# Patient Record
Sex: FEMALE | Race: WHITE | NOT HISPANIC OR LATINO | URBAN - METROPOLITAN AREA
[De-identification: names, ages, dates, MRNs, and addresses within clinical notes are randomized per-mention and may not be internally consistent; named-entity substitution may affect disease eponyms.]

---

## 2017-03-13 ENCOUNTER — INPATIENT (INPATIENT)
Facility: HOSPITAL | Age: 32
LOS: 2 days | Discharge: HOME | End: 2017-03-16
Attending: OBSTETRICS & GYNECOLOGY | Admitting: OBSTETRICS & GYNECOLOGY
Payer: COMMERCIAL

## 2017-03-14 VITALS
OXYGEN SATURATION: 100 % | DIASTOLIC BLOOD PRESSURE: 69 MMHG | SYSTOLIC BLOOD PRESSURE: 121 MMHG | HEART RATE: 85 BPM | RESPIRATION RATE: 17 BRPM

## 2017-03-14 LAB
BASOPHILS NFR BLD AUTO: 0.3 % — SIGNIFICANT CHANGE UP (ref 0–2)
BLD GP AB SCN SERPL QL: POSITIVE — SIGNIFICANT CHANGE UP
EOSINOPHIL NFR BLD AUTO: 0.9 % — SIGNIFICANT CHANGE UP (ref 0–6)
HCT VFR BLD CALC: 37.8 % — SIGNIFICANT CHANGE UP (ref 34.5–45)
HGB BLD-MCNC: 13.3 G/DL — SIGNIFICANT CHANGE UP (ref 11.5–15.5)
LYMPHOCYTES # BLD AUTO: 23.5 % — SIGNIFICANT CHANGE UP (ref 13–44)
MCHC RBC-ENTMCNC: 32 PG — SIGNIFICANT CHANGE UP (ref 27–34)
MCHC RBC-ENTMCNC: 35.2 G/DL — SIGNIFICANT CHANGE UP (ref 32–36)
MCV RBC AUTO: 90.9 FL — SIGNIFICANT CHANGE UP (ref 80–100)
MONOCYTES NFR BLD AUTO: 8.8 % — SIGNIFICANT CHANGE UP (ref 2–14)
NEUTROPHILS NFR BLD AUTO: 66.5 % — SIGNIFICANT CHANGE UP (ref 43–77)
PLATELET # BLD AUTO: 177 K/UL — SIGNIFICANT CHANGE UP (ref 150–400)
RBC # BLD: 4.16 M/UL — SIGNIFICANT CHANGE UP (ref 3.8–5.2)
RBC # FLD: 12.4 % — SIGNIFICANT CHANGE UP (ref 10.3–16.9)
RH IG SCN BLD-IMP: NEGATIVE — SIGNIFICANT CHANGE UP
T PALLIDUM AB TITR SER: NEGATIVE — SIGNIFICANT CHANGE UP
WBC # BLD: 11.6 K/UL — HIGH (ref 3.8–10.5)
WBC # FLD AUTO: 11.6 K/UL — HIGH (ref 3.8–10.5)

## 2017-03-14 PROCEDURE — 86077 PHYS BLOOD BANK SERV XMATCH: CPT

## 2017-03-14 RX ORDER — DIPHENHYDRAMINE HCL 50 MG
25 CAPSULE ORAL EVERY 6 HOURS
Qty: 0 | Refills: 0 | Status: DISCONTINUED | OUTPATIENT
Start: 2017-03-14 | End: 2017-03-16

## 2017-03-14 RX ORDER — OXYTOCIN 10 UNIT/ML
333.33 VIAL (ML) INJECTION
Qty: 20 | Refills: 0 | Status: DISCONTINUED | OUTPATIENT
Start: 2017-03-14 | End: 2017-03-14

## 2017-03-14 RX ORDER — SODIUM CHLORIDE 9 MG/ML
1000 INJECTION, SOLUTION INTRAVENOUS ONCE
Qty: 0 | Refills: 0 | Status: DISCONTINUED | OUTPATIENT
Start: 2017-03-14 | End: 2017-03-14

## 2017-03-14 RX ORDER — SODIUM CHLORIDE 9 MG/ML
1000 INJECTION, SOLUTION INTRAVENOUS
Qty: 0 | Refills: 0 | Status: DISCONTINUED | OUTPATIENT
Start: 2017-03-14 | End: 2017-03-14

## 2017-03-14 RX ORDER — DIBUCAINE 1 %
1 OINTMENT (GRAM) RECTAL EVERY 4 HOURS
Qty: 0 | Refills: 0 | Status: DISCONTINUED | OUTPATIENT
Start: 2017-03-14 | End: 2017-03-16

## 2017-03-14 RX ORDER — PRAMOXINE HYDROCHLORIDE 150 MG/15G
1 AEROSOL, FOAM RECTAL EVERY 4 HOURS
Qty: 0 | Refills: 0 | Status: DISCONTINUED | OUTPATIENT
Start: 2017-03-14 | End: 2017-03-16

## 2017-03-14 RX ORDER — SIMETHICONE 80 MG/1
80 TABLET, CHEWABLE ORAL EVERY 6 HOURS
Qty: 0 | Refills: 0 | Status: DISCONTINUED | OUTPATIENT
Start: 2017-03-14 | End: 2017-03-16

## 2017-03-14 RX ORDER — TETANUS TOXOID, REDUCED DIPHTHERIA TOXOID AND ACELLULAR PERTUSSIS VACCINE, ADSORBED 5; 2.5; 8; 8; 2.5 [IU]/.5ML; [IU]/.5ML; UG/.5ML; UG/.5ML; UG/.5ML
0.5 SUSPENSION INTRAMUSCULAR ONCE
Qty: 0 | Refills: 0 | Status: DISCONTINUED | OUTPATIENT
Start: 2017-03-14 | End: 2017-03-16

## 2017-03-14 RX ORDER — INFLUENZA VIRUS VACCINE 15; 15; 15; 15 UG/.5ML; UG/.5ML; UG/.5ML; UG/.5ML
0.5 SUSPENSION INTRAMUSCULAR ONCE
Qty: 0 | Refills: 0 | Status: COMPLETED | OUTPATIENT
Start: 2017-03-14 | End: 2017-03-14

## 2017-03-14 RX ORDER — IBUPROFEN 200 MG
1 TABLET ORAL
Qty: 0 | Refills: 0 | COMMUNITY
Start: 2017-03-14

## 2017-03-14 RX ORDER — CITRIC ACID/SODIUM CITRATE 300-500 MG
15 SOLUTION, ORAL ORAL EVERY 4 HOURS
Qty: 0 | Refills: 0 | Status: DISCONTINUED | OUTPATIENT
Start: 2017-03-14 | End: 2017-03-14

## 2017-03-14 RX ORDER — ACETAMINOPHEN 500 MG
650 TABLET ORAL EVERY 6 HOURS
Qty: 0 | Refills: 0 | Status: DISCONTINUED | OUTPATIENT
Start: 2017-03-14 | End: 2017-03-16

## 2017-03-14 RX ORDER — MAGNESIUM HYDROXIDE 400 MG/1
30 TABLET, CHEWABLE ORAL
Qty: 0 | Refills: 0 | Status: DISCONTINUED | OUTPATIENT
Start: 2017-03-14 | End: 2017-03-16

## 2017-03-14 RX ORDER — LANOLIN
1 OINTMENT (GRAM) TOPICAL EVERY 6 HOURS
Qty: 0 | Refills: 0 | Status: DISCONTINUED | OUTPATIENT
Start: 2017-03-14 | End: 2017-03-16

## 2017-03-14 RX ORDER — IBUPROFEN 200 MG
600 TABLET ORAL EVERY 6 HOURS
Qty: 0 | Refills: 0 | Status: DISCONTINUED | OUTPATIENT
Start: 2017-03-14 | End: 2017-03-16

## 2017-03-14 RX ORDER — AER TRAVELER 0.5 G/1
1 SOLUTION RECTAL; TOPICAL EVERY 4 HOURS
Qty: 0 | Refills: 0 | Status: DISCONTINUED | OUTPATIENT
Start: 2017-03-14 | End: 2017-03-16

## 2017-03-14 RX ORDER — DOCUSATE SODIUM 100 MG
100 CAPSULE ORAL
Qty: 0 | Refills: 0 | Status: DISCONTINUED | OUTPATIENT
Start: 2017-03-14 | End: 2017-03-16

## 2017-03-14 RX ORDER — HYDROCORTISONE 1 %
1 OINTMENT (GRAM) TOPICAL EVERY 4 HOURS
Qty: 0 | Refills: 0 | Status: DISCONTINUED | OUTPATIENT
Start: 2017-03-14 | End: 2017-03-16

## 2017-03-14 RX ORDER — LEVOTHYROXINE SODIUM 125 MCG
1 TABLET ORAL
Qty: 0 | Refills: 0 | COMMUNITY

## 2017-03-14 RX ORDER — GLYCERIN ADULT
1 SUPPOSITORY, RECTAL RECTAL AT BEDTIME
Qty: 0 | Refills: 0 | Status: DISCONTINUED | OUTPATIENT
Start: 2017-03-14 | End: 2017-03-16

## 2017-03-14 RX ORDER — SODIUM CHLORIDE 9 MG/ML
3 INJECTION INTRAMUSCULAR; INTRAVENOUS; SUBCUTANEOUS EVERY 8 HOURS
Qty: 0 | Refills: 0 | Status: DISCONTINUED | OUTPATIENT
Start: 2017-03-14 | End: 2017-03-16

## 2017-03-14 RX ORDER — OXYTOCIN 10 UNIT/ML
41.33 VIAL (ML) INJECTION
Qty: 20 | Refills: 0 | Status: DISCONTINUED | OUTPATIENT
Start: 2017-03-14 | End: 2017-03-16

## 2017-03-14 RX ADMIN — SODIUM CHLORIDE 3 MILLILITER(S): 9 INJECTION INTRAMUSCULAR; INTRAVENOUS; SUBCUTANEOUS at 13:42

## 2017-03-14 RX ADMIN — Medication 600 MILLIGRAM(S): at 21:24

## 2017-03-14 RX ADMIN — Medication 650 MILLIGRAM(S): at 05:40

## 2017-03-14 RX ADMIN — SODIUM CHLORIDE 3 MILLILITER(S): 9 INJECTION INTRAMUSCULAR; INTRAVENOUS; SUBCUTANEOUS at 21:27

## 2017-03-14 RX ADMIN — SODIUM CHLORIDE 3 MILLILITER(S): 9 INJECTION INTRAMUSCULAR; INTRAVENOUS; SUBCUTANEOUS at 07:45

## 2017-03-14 RX ADMIN — Medication 600 MILLIGRAM(S): at 13:42

## 2017-03-14 RX ADMIN — Medication 1 TABLET(S): at 13:03

## 2017-03-14 RX ADMIN — Medication 100 MILLIGRAM(S): at 13:03

## 2017-03-14 RX ADMIN — Medication 600 MILLIGRAM(S): at 03:33

## 2017-03-14 RX ADMIN — Medication 124 MILLIUNIT(S)/MIN: at 02:10

## 2017-03-14 RX ADMIN — Medication 1 APPLICATION(S): at 21:25

## 2017-03-14 RX ADMIN — Medication 600 MILLIGRAM(S): at 22:17

## 2017-03-14 RX ADMIN — Medication 600 MILLIGRAM(S): at 13:03

## 2017-03-14 RX ADMIN — Medication 650 MILLIGRAM(S): at 06:40

## 2017-03-14 NOTE — DISCHARGE NOTE OB - CARE PLAN
Principal Discharge DX:	Vaginal delivery  Goal:	normal recovery  Instructions for follow-up, activity and diet:	Follow up in the office in 6 weeks. Nothing in the vagina for 6 weeks.

## 2017-03-14 NOTE — DISCHARGE NOTE OB - CARE PROVIDER_API CALL
Rena Haynes), Obstetrics and Gynecology  [address] [place] [address]  Phone: [phone]  Fax: [phone]

## 2017-03-14 NOTE — DISCHARGE NOTE OB - PATIENT PORTAL LINK FT
“You can access the FollowHealth Patient Portal, offered by [facility], by registering with the following website: [email or web address]”

## 2017-03-14 NOTE — DISCHARGE NOTE OB - MEDICATION SUMMARY - MEDICATIONS TO TAKE
I will START or STAY ON the medications listed below when I get home from the hospital:    ibuprofen 600 mg oral tablet  -- 1 tab(s) by mouth every 6 hours, As needed, Moderate Pain  -- Indication: For PREGNANCY    Prenatal 1 oral capsule  -- 1 cap(s) by mouth once a day  -- Indication: For PREGNANCY I will START or STAY ON the medications listed below when I get home from the hospital:    ibuprofen 600 mg oral tablet  -- 1 tab(s) by mouth every 6 hours, As needed, Moderate Pain  -- Indication: For moderate pain    Prenatal 1 oral capsule  -- 1 cap(s) by mouth once a day  -- Indication: For PREGNANCY

## 2017-03-15 RX ADMIN — Medication 600 MILLIGRAM(S): at 14:15

## 2017-03-15 RX ADMIN — Medication 600 MILLIGRAM(S): at 13:17

## 2017-03-15 RX ADMIN — Medication 600 MILLIGRAM(S): at 19:33

## 2017-03-15 RX ADMIN — Medication 1 TABLET(S): at 13:17

## 2017-03-15 RX ADMIN — Medication 600 MILLIGRAM(S): at 06:00

## 2017-03-15 RX ADMIN — Medication 100 MILLIGRAM(S): at 13:17

## 2017-03-15 RX ADMIN — Medication 600 MILLIGRAM(S): at 06:41

## 2017-03-15 RX ADMIN — SODIUM CHLORIDE 3 MILLILITER(S): 9 INJECTION INTRAMUSCULAR; INTRAVENOUS; SUBCUTANEOUS at 06:40

## 2017-03-15 RX ADMIN — Medication 600 MILLIGRAM(S): at 19:04

## 2017-03-15 NOTE — PROGRESS NOTE ADULT - SUBJECTIVE AND OBJECTIVE BOX
Patient evaluated at bedside.   She reports pain is well controlled with motrin.  She denies headache, dizziness, chest pain, palpitations, shortness of breathe, nausea, vomiting, heavy vaginal bleeding or perineal discomfort.  She has been ambulating without assistance, voiding spontaneously, and is breastfeeding.    Physical Exam:  Vital Signs Last 24 Hrs  T(C): 36.4, Max: 36.8 (03-14 @ 09:51)  T(F): 97.6, Max: 98.3 (03-14 @ 09:51)  HR: 71 (71 - 91)  BP: 122/72 (108/72 - 122/72)  BP(mean): --  RR: 18 (16 - 18)  SpO2: 98% (95% - 98%)    GA: NAD, A+0 x 3  CV: RRR  Pulm: CTAB  Breasts: soft, nontender, no palpable masses  Abd: + BS, soft, nontender, nondistended, no rebound or guarding, uterus firm at midline, 2 fb below umbilicus  : lochia WNL  Extremities: no swelling or calf tenderness, reflexes +2 bilaterally                          13.3   11.6  )-----------( 177      ( 14 Mar 2017 01:33 )             37.8

## 2017-03-15 NOTE — PROGRESS NOTE ADULT - SUBJECTIVE AND OBJECTIVE BOX
Patient evaluated at bedside.   She reports pain is well controlled with Motrin only  She denies headache, dizziness, chest pain, palpitations, shortness of breathe, nausea, vomiting, heavy vaginal bleeding or perineal discomfort.  She has been ambulating without assistance, voiding spontaneously, and is breastfeeding.  Tolerating regular diet    Physical Exam:  VSS:     GA: NAD, A+0 x 3  CV: RRR  Pulm: CTAB  Breasts: soft, nontender, no palpable masses  Abd: + BS, soft, nontender, nondistended, no rebound or guarding, uterus firm at midline, 4  fb below umbilicus  : lochia WNL; Perineum: healing well  Extremities: no swelling or calf tenderness, reflexes +2 bilaterally                       13.3   11.6  )-----------( 177      ( 14 Mar 2017 01:33 )             37.8     Assessment:   PPD #  1 S/P   Plan:  Supportive Care  Diet as tolerated  Oral pain Meds  d/c home in AM

## 2017-03-15 NOTE — PROGRESS NOTE ADULT - ASSESSMENT
A/P yo 31y s/p , PPD #1, stable  1. Pain: Motrin prn  2. GI: Reg diet  3. Encourage breastfeeding  4. DVT prophylaxis: early ambulation  5. Dispo: PP2

## 2017-03-16 VITALS
RESPIRATION RATE: 18 BRPM | OXYGEN SATURATION: 97 % | TEMPERATURE: 98 F | SYSTOLIC BLOOD PRESSURE: 104 MMHG | DIASTOLIC BLOOD PRESSURE: 69 MMHG | HEART RATE: 86 BPM

## 2017-03-16 LAB — SURGICAL PATHOLOGY STUDY: SIGNIFICANT CHANGE UP

## 2017-03-16 PROCEDURE — 86780 TREPONEMA PALLIDUM: CPT

## 2017-03-16 PROCEDURE — 36415 COLL VENOUS BLD VENIPUNCTURE: CPT

## 2017-03-16 PROCEDURE — 86900 BLOOD TYPING SEROLOGIC ABO: CPT

## 2017-03-16 PROCEDURE — 86870 RBC ANTIBODY IDENTIFICATION: CPT

## 2017-03-16 PROCEDURE — 86880 COOMBS TEST DIRECT: CPT

## 2017-03-16 PROCEDURE — 86901 BLOOD TYPING SEROLOGIC RH(D): CPT

## 2017-03-16 PROCEDURE — 99214 OFFICE O/P EST MOD 30 MIN: CPT

## 2017-03-16 PROCEDURE — 88307 TISSUE EXAM BY PATHOLOGIST: CPT

## 2017-03-16 PROCEDURE — 85025 COMPLETE CBC W/AUTO DIFF WBC: CPT

## 2017-03-16 PROCEDURE — 86850 RBC ANTIBODY SCREEN: CPT

## 2017-03-16 RX ADMIN — Medication 600 MILLIGRAM(S): at 06:30

## 2017-03-16 RX ADMIN — Medication 100 MILLIGRAM(S): at 06:28

## 2017-03-16 RX ADMIN — Medication 600 MILLIGRAM(S): at 07:21

## 2017-03-16 NOTE — PROGRESS NOTE ADULT - SUBJECTIVE AND OBJECTIVE BOX
Patient evaluated at bedside.   She reports pain is well controlled with Motrin  She denies headache, dizziness, chest pain, palpitations, shortness of breathe, nausea, vomiting, heavy vaginal bleeding or perineal discomfort.  She has been ambulating without assistance, voiding spontaneously, and is breastfeeding.  Tolerating regular diet    Physical Exam:  VSS:     GA: NAD, A+0 x 3  CV: RRR  Pulm: CTAB  Breasts: soft, nontender, no palpable masses  Abd: + BS, soft, nontender, nondistended, no rebound or guarding, uterus firm at midline,   fb below umbilicus  : lochia WNL; Perineum: healing well  Extremities: no swelling or calf tenderness, reflexes +2 bilaterally    Assessment:   PPD #  2 S/P   Plan:  Supportive Care  Diet as tolerated  Oral pain Meds  Discharge home

## 2017-03-20 DIAGNOSIS — E03.9 HYPOTHYROIDISM, UNSPECIFIED: ICD-10-CM

## 2017-03-20 DIAGNOSIS — Z34.83 ENCOUNTER FOR SUPERVISION OF OTHER NORMAL PREGNANCY, THIRD TRIMESTER: ICD-10-CM

## 2017-03-20 DIAGNOSIS — Z3A.39 39 WEEKS GESTATION OF PREGNANCY: ICD-10-CM

## 2018-09-14 ENCOUNTER — TRANSCRIPTION ENCOUNTER (OUTPATIENT)
Age: 33
End: 2018-09-14

## 2018-09-15 ENCOUNTER — EMERGENCY (EMERGENCY)
Facility: HOSPITAL | Age: 33
LOS: 1 days | Discharge: HOME | End: 2018-09-15
Attending: EMERGENCY MEDICINE
Payer: COMMERCIAL

## 2018-09-15 VITALS
OXYGEN SATURATION: 98 % | RESPIRATION RATE: 18 BRPM | DIASTOLIC BLOOD PRESSURE: 74 MMHG | HEART RATE: 113 BPM | HEIGHT: 66 IN | TEMPERATURE: 99 F | WEIGHT: 138.01 LBS | SYSTOLIC BLOOD PRESSURE: 111 MMHG

## 2018-09-15 VITALS
HEART RATE: 70 BPM | TEMPERATURE: 98 F | DIASTOLIC BLOOD PRESSURE: 63 MMHG | OXYGEN SATURATION: 99 % | SYSTOLIC BLOOD PRESSURE: 93 MMHG | RESPIRATION RATE: 18 BRPM

## 2018-09-15 LAB
ALBUMIN SERPL ELPH-MCNC: 4 G/DL — SIGNIFICANT CHANGE UP (ref 3.3–5)
ALP SERPL-CCNC: 40 U/L — SIGNIFICANT CHANGE UP (ref 40–120)
ALT FLD-CCNC: 23 U/L — SIGNIFICANT CHANGE UP (ref 10–45)
ANION GAP SERPL CALC-SCNC: 9 MMOL/L — SIGNIFICANT CHANGE UP (ref 5–17)
APPEARANCE UR: CLEAR — SIGNIFICANT CHANGE UP
APTT BLD: 27.3 SEC — LOW (ref 27.5–37.4)
AST SERPL-CCNC: 28 U/L — SIGNIFICANT CHANGE UP (ref 10–40)
BACTERIA # UR AUTO: ABNORMAL
BASOPHILS # BLD AUTO: 0 K/UL — SIGNIFICANT CHANGE UP (ref 0–0.2)
BASOPHILS NFR BLD AUTO: 0 % — SIGNIFICANT CHANGE UP (ref 0–2)
BILIRUB SERPL-MCNC: 0.3 MG/DL — SIGNIFICANT CHANGE UP (ref 0.2–1.2)
BILIRUB UR-MCNC: NEGATIVE — SIGNIFICANT CHANGE UP
BUN SERPL-MCNC: 12 MG/DL — SIGNIFICANT CHANGE UP (ref 7–23)
CALCIUM SERPL-MCNC: 8.9 MG/DL — SIGNIFICANT CHANGE UP (ref 8.4–10.5)
CHLORIDE SERPL-SCNC: 100 MMOL/L — SIGNIFICANT CHANGE UP (ref 96–108)
CO2 SERPL-SCNC: 25 MMOL/L — SIGNIFICANT CHANGE UP (ref 22–31)
COLOR SPEC: YELLOW — SIGNIFICANT CHANGE UP
CREAT SERPL-MCNC: 0.87 MG/DL — SIGNIFICANT CHANGE UP (ref 0.5–1.3)
DIFF PNL FLD: ABNORMAL
EOSINOPHIL # BLD AUTO: 0 K/UL — SIGNIFICANT CHANGE UP (ref 0–0.5)
EOSINOPHIL NFR BLD AUTO: 0.8 % — SIGNIFICANT CHANGE UP (ref 0–6)
EPI CELLS # UR: 7 /HPF — HIGH
GAS PNL BLDV: SIGNIFICANT CHANGE UP
GLUCOSE SERPL-MCNC: 113 MG/DL — HIGH (ref 70–99)
GLUCOSE UR QL: NEGATIVE — SIGNIFICANT CHANGE UP
HCG UR QL: NEGATIVE — SIGNIFICANT CHANGE UP
HCT VFR BLD CALC: 39.5 % — SIGNIFICANT CHANGE UP (ref 34.5–45)
HGB BLD-MCNC: 13.3 G/DL — SIGNIFICANT CHANGE UP (ref 11.5–15.5)
HYALINE CASTS # UR AUTO: 1 /LPF — SIGNIFICANT CHANGE UP (ref 0–2)
INR BLD: 1.18 RATIO — HIGH (ref 0.88–1.16)
KETONES UR-MCNC: ABNORMAL
LEUKOCYTE ESTERASE UR-ACNC: ABNORMAL
LYMPHOCYTES # BLD AUTO: 0.8 K/UL — LOW (ref 1–3.3)
LYMPHOCYTES # BLD AUTO: 28.2 % — SIGNIFICANT CHANGE UP (ref 13–44)
MCHC RBC-ENTMCNC: 28.9 PG — SIGNIFICANT CHANGE UP (ref 27–34)
MCHC RBC-ENTMCNC: 33.6 GM/DL — SIGNIFICANT CHANGE UP (ref 32–36)
MCV RBC AUTO: 85.8 FL — SIGNIFICANT CHANGE UP (ref 80–100)
MONOCYTES # BLD AUTO: 0.7 K/UL — SIGNIFICANT CHANGE UP (ref 0–0.9)
MONOCYTES NFR BLD AUTO: 25.2 % — HIGH (ref 2–14)
NEUTROPHILS # BLD AUTO: 1.3 K/UL — LOW (ref 1.8–7.4)
NEUTROPHILS NFR BLD AUTO: 45.8 % — SIGNIFICANT CHANGE UP (ref 43–77)
NITRITE UR-MCNC: NEGATIVE — SIGNIFICANT CHANGE UP
PH UR: 6.5 — SIGNIFICANT CHANGE UP (ref 5–8)
PLATELET # BLD AUTO: 101 K/UL — LOW (ref 150–400)
POTASSIUM SERPL-MCNC: 4.2 MMOL/L — SIGNIFICANT CHANGE UP (ref 3.5–5.3)
POTASSIUM SERPL-SCNC: 4.2 MMOL/L — SIGNIFICANT CHANGE UP (ref 3.5–5.3)
PROT SERPL-MCNC: 7.3 G/DL — SIGNIFICANT CHANGE UP (ref 6–8.3)
PROT UR-MCNC: ABNORMAL
PROTHROM AB SERPL-ACNC: 12.8 SEC — HIGH (ref 9.8–12.7)
RAPID RVP RESULT: SIGNIFICANT CHANGE UP
RBC # BLD: 4.6 M/UL — SIGNIFICANT CHANGE UP (ref 3.8–5.2)
RBC # FLD: 12.5 % — SIGNIFICANT CHANGE UP (ref 10.3–14.5)
RBC CASTS # UR COMP ASSIST: 2 /HPF — SIGNIFICANT CHANGE UP (ref 0–4)
SODIUM SERPL-SCNC: 134 MMOL/L — LOW (ref 135–145)
SP GR SPEC: 1.03 — HIGH (ref 1.01–1.02)
UROBILINOGEN FLD QL: ABNORMAL
WBC # BLD: 2.7 K/UL — LOW (ref 3.8–10.5)
WBC # FLD AUTO: 2.7 K/UL — LOW (ref 3.8–10.5)
WBC UR QL: 7 /HPF — HIGH (ref 0–5)

## 2018-09-15 PROCEDURE — 80053 COMPREHEN METABOLIC PANEL: CPT

## 2018-09-15 PROCEDURE — 84295 ASSAY OF SERUM SODIUM: CPT

## 2018-09-15 PROCEDURE — 84132 ASSAY OF SERUM POTASSIUM: CPT

## 2018-09-15 PROCEDURE — 99284 EMERGENCY DEPT VISIT MOD MDM: CPT

## 2018-09-15 PROCEDURE — 99283 EMERGENCY DEPT VISIT LOW MDM: CPT | Mod: 25

## 2018-09-15 PROCEDURE — 87633 RESP VIRUS 12-25 TARGETS: CPT

## 2018-09-15 PROCEDURE — 82435 ASSAY OF BLOOD CHLORIDE: CPT

## 2018-09-15 PROCEDURE — 85610 PROTHROMBIN TIME: CPT

## 2018-09-15 PROCEDURE — 71046 X-RAY EXAM CHEST 2 VIEWS: CPT | Mod: 26

## 2018-09-15 PROCEDURE — 82947 ASSAY GLUCOSE BLOOD QUANT: CPT

## 2018-09-15 PROCEDURE — 87798 DETECT AGENT NOS DNA AMP: CPT

## 2018-09-15 PROCEDURE — 82330 ASSAY OF CALCIUM: CPT

## 2018-09-15 PROCEDURE — 87581 M.PNEUMON DNA AMP PROBE: CPT

## 2018-09-15 PROCEDURE — 81001 URINALYSIS AUTO W/SCOPE: CPT

## 2018-09-15 PROCEDURE — 85027 COMPLETE CBC AUTOMATED: CPT

## 2018-09-15 PROCEDURE — 82803 BLOOD GASES ANY COMBINATION: CPT

## 2018-09-15 PROCEDURE — 83605 ASSAY OF LACTIC ACID: CPT

## 2018-09-15 PROCEDURE — 71046 X-RAY EXAM CHEST 2 VIEWS: CPT

## 2018-09-15 PROCEDURE — 87486 CHLMYD PNEUM DNA AMP PROBE: CPT

## 2018-09-15 PROCEDURE — 85730 THROMBOPLASTIN TIME PARTIAL: CPT

## 2018-09-15 PROCEDURE — 81025 URINE PREGNANCY TEST: CPT

## 2018-09-15 PROCEDURE — 85014 HEMATOCRIT: CPT

## 2018-09-15 RX ORDER — SODIUM CHLORIDE 9 MG/ML
1000 INJECTION INTRAMUSCULAR; INTRAVENOUS; SUBCUTANEOUS ONCE
Qty: 0 | Refills: 0 | Status: COMPLETED | OUTPATIENT
Start: 2018-09-15 | End: 2018-09-15

## 2018-09-15 RX ORDER — IBUPROFEN 200 MG
400 TABLET ORAL ONCE
Qty: 0 | Refills: 0 | Status: COMPLETED | OUTPATIENT
Start: 2018-09-15 | End: 2018-09-15

## 2018-09-15 RX ORDER — ACETAMINOPHEN 500 MG
650 TABLET ORAL ONCE
Qty: 0 | Refills: 0 | Status: COMPLETED | OUTPATIENT
Start: 2018-09-15 | End: 2018-09-15

## 2018-09-15 RX ADMIN — Medication 650 MILLIGRAM(S): at 13:10

## 2018-09-15 RX ADMIN — Medication 400 MILLIGRAM(S): at 14:01

## 2018-09-15 RX ADMIN — Medication 650 MILLIGRAM(S): at 10:51

## 2018-09-15 RX ADMIN — SODIUM CHLORIDE 2000 MILLILITER(S): 9 INJECTION INTRAMUSCULAR; INTRAVENOUS; SUBCUTANEOUS at 10:52

## 2018-09-15 NOTE — ED PROVIDER NOTE - PLAN OF CARE
1. Take Tylenol 650 mg every 6-8 hours or Motrin 600 mg every 8 hours as need for fevers and aches  2. Stay hydrated  3. Follow up with your primary care doctor within 3 days  4. Return to the emergency department for any vomiting, abdominal pain or any other concerns.

## 2018-09-15 NOTE — ED ADULT NURSE NOTE - OBJECTIVE STATEMENT
33 y.o. Female presents to the ED c/o fever. Hx hypothyroidism. A&Ox3. Ambulatory. Pt reports coming back from [place] last sat and started to feel a headache, fever/chills and neck stiffness. Pt reports feeling headache behind her eyes at first radiating to top part of her head to occipital part of head. Neuro intact. +ROM in all four extremities. Pt has been taking advil and states it breaks her fever, but symptoms persisted. Denies CP, SOB, N/V/D, urinary complications. Pt is in no current distress. Comfort and safety provided. Will continue to monitor. Awaiting ED MD consult.

## 2018-09-15 NOTE — ED PROVIDER NOTE - OBJECTIVE STATEMENT
32 y/o F hx of hypothyroidism presents with headache & fever.    States fever has been ongoing since last Monday 6 days PTA. Returned from [facility] last Saturday. Associated with sinus pressure. Saw PMD on Wednesday and told likely had viral infection. Has been feeling intermittent chills; Tmax 101 at home. Noted stiffness in neck yesterday and went to urgent care and given muscle relaxant. No dysuria, cough, productive sputum or diarrhea. No sick contacts. Relief with ibuprofen. Did not receive any antibiotics.       PMD: Dr. Dinh 32 y/o F hx of hypothyroidism presents with headache & fever.    States fever has been ongoing since last Monday 6 days PTA. Returned from [facility] last Saturday. Associated with sinus pressure. Saw PMD on Wednesday and told likely had viral infection. Has been feeling intermittent chills; Tmax 101 at home. Noted stiffness in neck yesterday and went to urgent care and given muscle relaxant. No dysuria, cough, productive sputum or diarrhea. No sick contacts. Relief with ibuprofen. Did not receive any antibiotics. No myalgias or photophobia.       PMD: Dr. Dinh

## 2018-09-15 NOTE — ED ADULT NURSE NOTE - DISCHARGE TEACHING
Follow up with PMD, take Tylenol/motrin as needed q6-8hrs as needed for chills/fever, stay hydrated, rest, return for new or worsening s/s

## 2018-09-15 NOTE — ED PROVIDER NOTE - PROGRESS NOTE DETAILS
Lab work unrevealing for bacterial source of fevers. Patient reports symptomatic improvement s/p NSAIDs & IVF. Will discharge with PCP follow up.

## 2018-09-15 NOTE — ED PROVIDER NOTE - PHYSICAL EXAMINATION
GENERAL: non-toxic appearing  HEAD:  Atraumatic, Normocephalic  EYES: EOMI, PERRLA, conjunctiva and sclera clear  ENT: MMM; oropharynx clear  NECK: Supple, No JVD +Brudzinski  CHEST/LUNG: Clear to auscultation bilaterally; No wheeze  HEART: Regular rate and rhythm; No murmurs, rubs, or gallops  ABDOMEN: Soft, Nontender, Nondistended; Bowel sounds present  EXTREMITIES:  2+ Peripheral Pulses, No clubbing, cyanosis, or edema  PSYCH: AAOx3  NEUROLOGY: no focal motor or sensory deficits. 5/5 muscle strength in all extremities.   SKIN: No rashes or lesions GENERAL: non-toxic appearing  HEAD:  Atraumatic, Normocephalic  EYES: EOMI, PERRLA, conjunctiva and sclera clear  ENT: MMM; oropharynx clear  NECK: Supple, No JVD  CHEST/LUNG: Clear to auscultation bilaterally; No wheeze  HEART: Regular rate and rhythm; No murmurs, rubs, or gallops  ABDOMEN: Soft, Nontender, Nondistended; Bowel sounds present  EXTREMITIES:  2+ Peripheral Pulses, No clubbing, cyanosis, or edema  PSYCH: AAOx3  NEUROLOGY: no focal motor or sensory deficits. 5/5 muscle strength in all extremities.   SKIN: No rashes or lesions GENERAL: non-toxic appearing  HEAD:  Atraumatic, Normocephalic  EYES: EOMI, PERRLA, conjunctiva and sclera clear  ENT: MMM; oropharynx clear  NECK: Supple, No JVD; mild restriction in ROM 2/2 to pain without overt nuchal rigidity.   CHEST/LUNG: Clear to auscultation bilaterally; No wheeze  HEART: Regular rate and rhythm; No murmurs, rubs, or gallops  ABDOMEN: Soft, Nontender, Nondistended; Bowel sounds present  EXTREMITIES:  2+ Peripheral Pulses, No clubbing, cyanosis, or edema  PSYCH: AAOx3  NEUROLOGY: no focal motor or sensory deficits. 5/5 muscle strength in all extremities.   SKIN: No rashes or lesions

## 2018-09-15 NOTE — ED PROVIDER NOTE - CARE PLAN
Principal Discharge DX:	Viral illness  Assessment and plan of treatment:	1. Take Tylenol 650 mg every 6-8 hours or Motrin 600 mg every 8 hours as need for fevers and aches  2. Stay hydrated  3. Follow up with your primary care doctor within 3 days  4. Return to the emergency department for any vomiting, abdominal pain or any other concerns.

## 2018-09-15 NOTE — ED PROVIDER NOTE - MEDICAL DECISION MAKING DETAILS
34 y/o F hx of hypothyroidism presents with headache, fever & neck pain. Likely viral meningitis vs. viral illness. Non-toxic appearing not meeting sepsis criteria. Plan: IVF, basic labs, CXR, UA, reassess.

## 2018-09-15 NOTE — ED PROVIDER NOTE - ATTENDING CONTRIBUTION TO CARE
34 yo female with 5-6d of fever, chills, myalgias, neck/back pain.  On my exam -- smiling, conversant, no distress, neck supple, alert + oriented.  Well-appearing.  Labs with leukopenia, thrombocytopenia.  Likely viral syndrome.  A significant meningitis/encephalitis is exceedingly unlikely and I firmly believe further work-up/testing for this, including CSF access and sampling, is more likely to harm the patient than benefit her.  Will give fluids, motrin, send RVP, reassess.

## 2020-05-09 NOTE — ED PROVIDER NOTE - NS ED MD DISPO DISCHARGE CCDA
/54   Pulse 78   Temp 96.1  F (35.6  C) (Oral)   Resp 16   Ht 1.524 m (5')   Wt 55.3 kg (122 lb)   SpO2 96%   BMI 23.83 kg/m      Acute appendicitis with perforation and abscess.    Neuro: WNL  Cardiac: WNL  Lungs: WNL  GI: intermittent nausea, improved with Medication  : Baseline incontinence, has voided post surgery   Pain: denies at this time   IV: D5 0.45 NS with Kcl 20 75/hr  Diet: Clears   Activity: Assist of 1   Misc: SHELDON drain  Plan: IV Zosyn starting tonight q 6 hours, continue to monitor and provide cares.     Patient is stable and on 1L of 02.  Assist of 1. Will continue to monitor and provide cares.      Manny Damian RN    Patient/Caregiver provided printed discharge information.

## 2020-09-02 PROBLEM — E03.9 HYPOTHYROIDISM, UNSPECIFIED: Chronic | Status: ACTIVE | Noted: 2018-09-15

## 2020-09-22 PROBLEM — Z00.00 ENCOUNTER FOR PREVENTIVE HEALTH EXAMINATION: Status: ACTIVE | Noted: 2020-09-22

## 2020-09-23 ENCOUNTER — APPOINTMENT (OUTPATIENT)
Dept: ANTEPARTUM | Facility: CLINIC | Age: 35
End: 2020-09-23

## 2020-09-23 ENCOUNTER — ASOB RESULT (OUTPATIENT)
Age: 35
End: 2020-09-23

## 2020-11-04 ENCOUNTER — APPOINTMENT (OUTPATIENT)
Dept: ANTEPARTUM | Facility: CLINIC | Age: 35
End: 2020-11-04
Payer: COMMERCIAL

## 2020-11-04 ENCOUNTER — ASOB RESULT (OUTPATIENT)
Age: 35
End: 2020-11-04

## 2020-11-04 PROCEDURE — 76811 OB US DETAILED SNGL FETUS: CPT

## 2020-11-04 PROCEDURE — 99072 ADDL SUPL MATRL&STAF TM PHE: CPT

## 2020-12-11 ENCOUNTER — OUTPATIENT (OUTPATIENT)
Dept: OUTPATIENT SERVICES | Facility: HOSPITAL | Age: 35
LOS: 1 days | End: 2020-12-11
Payer: COMMERCIAL

## 2020-12-11 VITALS — HEART RATE: 72 BPM | SYSTOLIC BLOOD PRESSURE: 116 MMHG | DIASTOLIC BLOOD PRESSURE: 75 MMHG | OXYGEN SATURATION: 100 %

## 2020-12-11 VITALS — SYSTOLIC BLOOD PRESSURE: 124 MMHG | OXYGEN SATURATION: 100 % | HEART RATE: 72 BPM | DIASTOLIC BLOOD PRESSURE: 77 MMHG

## 2020-12-11 DIAGNOSIS — O26.899 OTHER SPECIFIED PREGNANCY RELATED CONDITIONS, UNSPECIFIED TRIMESTER: ICD-10-CM

## 2020-12-11 DIAGNOSIS — E03.9 HYPOTHYROIDISM, UNSPECIFIED: Chronic | ICD-10-CM

## 2020-12-11 DIAGNOSIS — Z3A.00 WEEKS OF GESTATION OF PREGNANCY NOT SPECIFIED: ICD-10-CM

## 2020-12-11 LAB
APPEARANCE UR: CLEAR — SIGNIFICANT CHANGE UP
BACTERIA # UR AUTO: NEGATIVE — SIGNIFICANT CHANGE UP
BILIRUB UR-MCNC: NEGATIVE — SIGNIFICANT CHANGE UP
COLOR SPEC: SIGNIFICANT CHANGE UP
DIFF PNL FLD: NEGATIVE — SIGNIFICANT CHANGE UP
EPI CELLS # UR: 1 /HPF — SIGNIFICANT CHANGE UP
GLUCOSE UR QL: NEGATIVE — SIGNIFICANT CHANGE UP
HYALINE CASTS # UR AUTO: 1 /LPF — SIGNIFICANT CHANGE UP (ref 0–2)
KETONES UR-MCNC: NEGATIVE — SIGNIFICANT CHANGE UP
LEUKOCYTE ESTERASE UR-ACNC: NEGATIVE — SIGNIFICANT CHANGE UP
NITRITE UR-MCNC: NEGATIVE — SIGNIFICANT CHANGE UP
PH UR: 6.5 — SIGNIFICANT CHANGE UP (ref 5–8)
PROT UR-MCNC: NEGATIVE — SIGNIFICANT CHANGE UP
RBC CASTS # UR COMP ASSIST: 1 /HPF — SIGNIFICANT CHANGE UP (ref 0–4)
SP GR SPEC: 1.02 — SIGNIFICANT CHANGE UP (ref 1.01–1.02)
UROBILINOGEN FLD QL: NEGATIVE — SIGNIFICANT CHANGE UP
WBC UR QL: 1 /HPF — SIGNIFICANT CHANGE UP (ref 0–5)

## 2020-12-11 PROCEDURE — G0463: CPT

## 2020-12-11 PROCEDURE — 87086 URINE CULTURE/COLONY COUNT: CPT

## 2020-12-11 PROCEDURE — 81001 URINALYSIS AUTO W/SCOPE: CPT

## 2020-12-11 PROCEDURE — 59025 FETAL NON-STRESS TEST: CPT

## 2020-12-11 NOTE — OB PROVIDER TRIAGE NOTE - HISTORY OF PRESENT ILLNESS
36yo  at 28w2d presenting with pelvic pressure and occasional cramping. Patient has has an uncomplicated pregnancy thus far. She is being followed for low estrogen levels but fetal weight has been within nl limits.    ObHx:  FT  ~7lb  2017 FT  ~7lb  GynHx: hx of HPV+ with colpo  denies other gyn hx  PMSHx: Hypothyroidism , anxiety  Meds: Levothyroxine 50mcg  All: NKDA  Social: deniex toxic exposures

## 2020-12-11 NOTE — OB PROVIDER TRIAGE NOTE - NS_FETALMOVEMENTDETAILS_OBGYN_ALL_OB_FT
Pt would like a refill for birth control, she has appt for annual with BEN on 6/5/17      Current Outpatient Prescriptions:  TRI-ESTARYLLA 0.18/0.215/0.25 MG-35 MCG Oral Tab Take 1 tablet by mouth once daily.  Disp: 28 tablet Rfl: 12 present

## 2020-12-11 NOTE — OB PROVIDER TRIAGE NOTE - NSHPPHYSICALEXAM_GEN_ALL_CORE
ICU Vital Signs Last 24 Hrs  T(C): 36.6 (11 Dec 2020 19:09), Max: 36.6 (11 Dec 2020 19:06)  T(F): 97.88 (11 Dec 2020 19:09), Max: 97.9 (11 Dec 2020 19:06)  HR: 84 (11 Dec 2020 22:09) (69 - 86)  BP: 124/77 (11 Dec 2020 19:06) (124/77 - 124/77)  RR: 18 (11 Dec 2020 19:06) (18 - 18)  SpO2: 100% (11 Dec 2020 22:09) (98% - 100%)    Physical Exam:   General: sitting comfortably in bed, NAD   Back: No CVA tenderness  Abd: NTND, gravid  Vaginal: no vaginal bleeding noted on pad   CL 4 and BPP 8/8  Ext: NT b/l, no edema

## 2020-12-11 NOTE — OB PROVIDER TRIAGE NOTE - NSOBPROVIDERNOTE_OBGYN_ALL_OB_FT
36yo  at 28w2d here with pelvic pressure and cramping. Irritability on toco with no sign of UTI on UA. No sign of  labor and fetal status reassuring.  -d/c with labor precautions  -f/u as scheduled 34yo  at 28w2d here with pelvic pressure and cramping. Irritability on toco with no sign of UTI on UA. No sign of  labor and fetal status reassuring.  -d/c with labor precautions  -f/u as scheduled    Attending note    Reviewed above. No PTL. UA negative. Reviewed tracing reactive for 25 weeks. FU in office    Diana Starks DO

## 2020-12-11 NOTE — OB PROVIDER TRIAGE NOTE - NSHPLABSRESULTS_GEN_ALL_CORE
Urinalysis Basic - ( 11 Dec 2020 20:06 )    Color: Light Yellow / Appearance: Clear / S.016 / pH: x  Gluc: x / Ketone: Negative  / Bili: Negative / Urobili: Negative   Blood: x / Protein: Negative / Nitrite: Negative   Leuk Esterase: Negative / RBC: 1 /hpf / WBC 1 /HPF   Sq Epi: x / Non Sq Epi: 1 /hpf / Bacteria: Negative

## 2020-12-12 LAB
CULTURE RESULTS: SIGNIFICANT CHANGE UP
SPECIMEN SOURCE: SIGNIFICANT CHANGE UP

## 2021-03-23 ENCOUNTER — INPATIENT (INPATIENT)
Facility: HOSPITAL | Age: 36
LOS: 0 days | Discharge: HOME | End: 2021-03-24
Attending: OBSTETRICS & GYNECOLOGY | Admitting: OBSTETRICS & GYNECOLOGY
Payer: COMMERCIAL

## 2021-03-23 VITALS
DIASTOLIC BLOOD PRESSURE: 82 MMHG | HEART RATE: 77 BPM | RESPIRATION RATE: 18 BRPM | TEMPERATURE: 98 F | SYSTOLIC BLOOD PRESSURE: 125 MMHG | OXYGEN SATURATION: 100 %

## 2021-03-23 DIAGNOSIS — Z34.80 ENCOUNTER FOR SUPERVISION OF OTHER NORMAL PREGNANCY, UNSPECIFIED TRIMESTER: ICD-10-CM

## 2021-03-23 DIAGNOSIS — O26.899 OTHER SPECIFIED PREGNANCY RELATED CONDITIONS, UNSPECIFIED TRIMESTER: ICD-10-CM

## 2021-03-23 DIAGNOSIS — Z3A.00 WEEKS OF GESTATION OF PREGNANCY NOT SPECIFIED: ICD-10-CM

## 2021-03-23 DIAGNOSIS — E03.9 HYPOTHYROIDISM, UNSPECIFIED: Chronic | ICD-10-CM

## 2021-03-23 LAB
BASOPHILS # BLD AUTO: 0.01 K/UL — SIGNIFICANT CHANGE UP (ref 0–0.2)
BASOPHILS NFR BLD AUTO: 0.1 % — SIGNIFICANT CHANGE UP (ref 0–2)
BLD GP AB SCN SERPL QL: NEGATIVE — SIGNIFICANT CHANGE UP
EOSINOPHIL # BLD AUTO: 0.02 K/UL — SIGNIFICANT CHANGE UP (ref 0–0.5)
EOSINOPHIL NFR BLD AUTO: 0.3 % — SIGNIFICANT CHANGE UP (ref 0–6)
HCT VFR BLD CALC: 36.1 % — SIGNIFICANT CHANGE UP (ref 34.5–45)
HGB BLD-MCNC: 12.2 G/DL — SIGNIFICANT CHANGE UP (ref 11.5–15.5)
IMM GRANULOCYTES NFR BLD AUTO: 0.1 % — SIGNIFICANT CHANGE UP (ref 0–1.5)
LYMPHOCYTES # BLD AUTO: 1.86 K/UL — SIGNIFICANT CHANGE UP (ref 1–3.3)
LYMPHOCYTES # BLD AUTO: 27.8 % — SIGNIFICANT CHANGE UP (ref 13–44)
MCHC RBC-ENTMCNC: 30.4 PG — SIGNIFICANT CHANGE UP (ref 27–34)
MCHC RBC-ENTMCNC: 33.8 GM/DL — SIGNIFICANT CHANGE UP (ref 32–36)
MCV RBC AUTO: 90 FL — SIGNIFICANT CHANGE UP (ref 80–100)
MONOCYTES # BLD AUTO: 0.6 K/UL — SIGNIFICANT CHANGE UP (ref 0–0.9)
MONOCYTES NFR BLD AUTO: 9 % — SIGNIFICANT CHANGE UP (ref 2–14)
NEUTROPHILS # BLD AUTO: 4.19 K/UL — SIGNIFICANT CHANGE UP (ref 1.8–7.4)
NEUTROPHILS NFR BLD AUTO: 62.7 % — SIGNIFICANT CHANGE UP (ref 43–77)
NRBC # BLD: 0 /100 WBCS — SIGNIFICANT CHANGE UP (ref 0–0)
PLATELET # BLD AUTO: 202 K/UL — SIGNIFICANT CHANGE UP (ref 150–400)
RBC # BLD: 4.01 M/UL — SIGNIFICANT CHANGE UP (ref 3.8–5.2)
RBC # FLD: 13.2 % — SIGNIFICANT CHANGE UP (ref 10.3–14.5)
RH IG SCN BLD-IMP: NEGATIVE — SIGNIFICANT CHANGE UP
SARS-COV-2 RNA SPEC QL NAA+PROBE: SIGNIFICANT CHANGE UP
WBC # BLD: 6.69 K/UL — SIGNIFICANT CHANGE UP (ref 3.8–10.5)
WBC # FLD AUTO: 6.69 K/UL — SIGNIFICANT CHANGE UP (ref 3.8–10.5)

## 2021-03-23 RX ORDER — PRAMOXINE HYDROCHLORIDE 150 MG/15G
1 AEROSOL, FOAM RECTAL EVERY 4 HOURS
Refills: 0 | Status: DISCONTINUED | OUTPATIENT
Start: 2021-03-23 | End: 2021-03-24

## 2021-03-23 RX ORDER — DIPHENHYDRAMINE HCL 50 MG
25 CAPSULE ORAL EVERY 6 HOURS
Refills: 0 | Status: DISCONTINUED | OUTPATIENT
Start: 2021-03-23 | End: 2021-03-24

## 2021-03-23 RX ORDER — OXYTOCIN 10 UNIT/ML
333.33 VIAL (ML) INJECTION
Qty: 20 | Refills: 0 | Status: DISCONTINUED | OUTPATIENT
Start: 2021-03-23 | End: 2021-03-24

## 2021-03-23 RX ORDER — OXYCODONE HYDROCHLORIDE 5 MG/1
5 TABLET ORAL ONCE
Refills: 0 | Status: DISCONTINUED | OUTPATIENT
Start: 2021-03-23 | End: 2021-03-24

## 2021-03-23 RX ORDER — HYDROCORTISONE 1 %
1 OINTMENT (GRAM) TOPICAL EVERY 6 HOURS
Refills: 0 | Status: DISCONTINUED | OUTPATIENT
Start: 2021-03-23 | End: 2021-03-24

## 2021-03-23 RX ORDER — KETOROLAC TROMETHAMINE 30 MG/ML
30 SYRINGE (ML) INJECTION ONCE
Refills: 0 | Status: DISCONTINUED | OUTPATIENT
Start: 2021-03-23 | End: 2021-03-23

## 2021-03-23 RX ORDER — CITRIC ACID/SODIUM CITRATE 300-500 MG
15 SOLUTION, ORAL ORAL EVERY 6 HOURS
Refills: 0 | Status: DISCONTINUED | OUTPATIENT
Start: 2021-03-23 | End: 2021-03-23

## 2021-03-23 RX ORDER — SIMETHICONE 80 MG/1
80 TABLET, CHEWABLE ORAL EVERY 4 HOURS
Refills: 0 | Status: DISCONTINUED | OUTPATIENT
Start: 2021-03-23 | End: 2021-03-24

## 2021-03-23 RX ORDER — OXYTOCIN 10 UNIT/ML
4 VIAL (ML) INJECTION
Qty: 30 | Refills: 0 | Status: DISCONTINUED | OUTPATIENT
Start: 2021-03-23 | End: 2021-03-24

## 2021-03-23 RX ORDER — LEVOTHYROXINE SODIUM 125 MCG
50 TABLET ORAL DAILY
Refills: 0 | Status: DISCONTINUED | OUTPATIENT
Start: 2021-03-23 | End: 2021-03-24

## 2021-03-23 RX ORDER — MAGNESIUM HYDROXIDE 400 MG/1
30 TABLET, CHEWABLE ORAL
Refills: 0 | Status: DISCONTINUED | OUTPATIENT
Start: 2021-03-23 | End: 2021-03-24

## 2021-03-23 RX ORDER — AER TRAVELER 0.5 G/1
1 SOLUTION RECTAL; TOPICAL EVERY 4 HOURS
Refills: 0 | Status: DISCONTINUED | OUTPATIENT
Start: 2021-03-23 | End: 2021-03-24

## 2021-03-23 RX ORDER — IBUPROFEN 200 MG
600 TABLET ORAL EVERY 6 HOURS
Refills: 0 | Status: COMPLETED | OUTPATIENT
Start: 2021-03-23 | End: 2022-02-19

## 2021-03-23 RX ORDER — LANOLIN
1 OINTMENT (GRAM) TOPICAL EVERY 6 HOURS
Refills: 0 | Status: DISCONTINUED | OUTPATIENT
Start: 2021-03-23 | End: 2021-03-24

## 2021-03-23 RX ORDER — DIBUCAINE 1 %
1 OINTMENT (GRAM) RECTAL EVERY 6 HOURS
Refills: 0 | Status: DISCONTINUED | OUTPATIENT
Start: 2021-03-23 | End: 2021-03-24

## 2021-03-23 RX ORDER — OXYCODONE HYDROCHLORIDE 5 MG/1
5 TABLET ORAL
Refills: 0 | Status: DISCONTINUED | OUTPATIENT
Start: 2021-03-23 | End: 2021-03-24

## 2021-03-23 RX ORDER — SODIUM CHLORIDE 9 MG/ML
3 INJECTION INTRAMUSCULAR; INTRAVENOUS; SUBCUTANEOUS EVERY 8 HOURS
Refills: 0 | Status: DISCONTINUED | OUTPATIENT
Start: 2021-03-23 | End: 2021-03-24

## 2021-03-23 RX ORDER — ACETAMINOPHEN 500 MG
975 TABLET ORAL
Refills: 0 | Status: DISCONTINUED | OUTPATIENT
Start: 2021-03-23 | End: 2021-03-24

## 2021-03-23 RX ORDER — SODIUM CHLORIDE 9 MG/ML
1000 INJECTION, SOLUTION INTRAVENOUS
Refills: 0 | Status: DISCONTINUED | OUTPATIENT
Start: 2021-03-23 | End: 2021-03-23

## 2021-03-23 RX ORDER — TETANUS TOXOID, REDUCED DIPHTHERIA TOXOID AND ACELLULAR PERTUSSIS VACCINE, ADSORBED 5; 2.5; 8; 8; 2.5 [IU]/.5ML; [IU]/.5ML; UG/.5ML; UG/.5ML; UG/.5ML
0.5 SUSPENSION INTRAMUSCULAR ONCE
Refills: 0 | Status: DISCONTINUED | OUTPATIENT
Start: 2021-03-23 | End: 2021-03-24

## 2021-03-23 RX ORDER — BENZOCAINE 10 %
1 GEL (GRAM) MUCOUS MEMBRANE EVERY 6 HOURS
Refills: 0 | Status: DISCONTINUED | OUTPATIENT
Start: 2021-03-23 | End: 2021-03-24

## 2021-03-23 RX ADMIN — Medication 30 MILLIGRAM(S): at 18:51

## 2021-03-23 RX ADMIN — SODIUM CHLORIDE 3 MILLILITER(S): 9 INJECTION INTRAMUSCULAR; INTRAVENOUS; SUBCUTANEOUS at 22:00

## 2021-03-23 RX ADMIN — Medication 975 MILLIGRAM(S): at 21:07

## 2021-03-23 NOTE — OB PROVIDER H&P - PROBLEM SELECTOR PLAN 1
Admit  Routine labs, COVID PCR  cont efm/toco  anesthesia consult  for AROM and augmentation.  seen w/ Dr. Wyman.  RHINA Carcamo

## 2021-03-23 NOTE — OB PROVIDER TRIAGE NOTE - NSHPPHYSICALEXAM_GEN_ALL_CORE
ICU Vital Signs Last 24 Hrs  T(C): 36.5 (23 Mar 2021 08:40), Max: 36.5 (23 Mar 2021 08:37)  T(F): 97.7 (23 Mar 2021 08:40), Max: 97.7 (23 Mar 2021 08:37)  HR: 78 (23 Mar 2021 10:35) (74 - 103)  BP: 125/82 (23 Mar 2021 08:41) (125/82 - 125/82)  RR: 18 (23 Mar 2021 08:40) (18 - 18)  SpO2: 99% (23 Mar 2021 10:35) (90% - 100%)  gen: NAD  cards: clear S1S2, RRR  Pulm: CTA b/l  abd: soft, gravid  ve: 4/70/-3 anchored on patient's right, soft multip cervix

## 2021-03-23 NOTE — OB PROVIDER LABOR PROGRESS NOTE - ASSESSMENT
arom - clear fluid. consents signed. pt may have epi as needed.   efw 3/10=7-11. gbs neg    j cafaro

## 2021-03-23 NOTE — OB PROVIDER DELIVERY SUMMARY - NSPROVIDERDELIVERYNOTE_OBGYN_ALL_OB_FT
Spontaneous vaginal delivery of liveborn infant from OA position. Head, shoulders, and body delivered easily. Infant passed to mother. Cord was clamped and cut. Placenta delivered spontaneously, noted to be intact. Fundal massage was given and uterine fundus was found to be firm. Vaginal exam revealed intact cervix, sulci, vaginal walls. Perineum with first degree laceration, repaired in standard fashion with chromic suture. Excellent hemostasis was noted. Patient stable. Count correct x 2.    ROBERTO Garrett PGY3  w/ Dr. Wyman

## 2021-03-23 NOTE — OB PROVIDER H&P - ATTENDING COMMENTS
PT WAS SITTING IN TRIAGE SEVERAL HRS. CTXS MILD & IRREG. EXAM IN GIOVANNI POSITION REVEALED CVX 4/80/-3/MEMBRANES BULDGING. PT STATES LAST PREGNANCY WENT QUICKLY AFTER ROM. SHE AGREED TO HAVE AROM BUT WANT TO HOLD OFF ON EPI &/OR PITOCIN. PT TRANSFERRED TO [address]

## 2021-03-23 NOTE — OB PROVIDER H&P - NSHPPHYSICALEXAM_GEN_ALL_CORE
Vital Signs Last 24 Hrs  T(C): 36.5 (23 Mar 2021 11:48), Max: 36.5 (23 Mar 2021 08:37)  T(F): 97.7 (23 Mar 2021 11:48), Max: 97.7 (23 Mar 2021 08:37)  HR: 85 (23 Mar 2021 12:05) (71 - 103)  BP: 125/82 (23 Mar 2021 11:48) (125/82 - 125/82)  RR: 18 (23 Mar 2021 11:48) (18 - 18)  SpO2: 98% (23 Mar 2021 12:05) (90% - 100%)  gen: NAD   cards: clear S1S2, RRR  pulm: CTA b/l  abd: soft, gravid

## 2021-03-23 NOTE — OB PROVIDER TRIAGE NOTE - NSOBPROVIDERNOTE_OBGYN_ALL_OB_FT
A/P: 34 y/o multip at term presenting in early labor.  -pt offered admission and augmentation vs. expectant management. Pt will decide.   -cont efm/toco until decision.  d/w Dr. Wyman.  RHINA Carcamo

## 2021-03-23 NOTE — OB RN TRIAGE NOTE - TEMPERATURE IN FAHRENHEIT (DEGREES F)
Patient called and needs a refill on atorvastatin (LIPITOR) 10 MG tablet and has appointment for her annual physical with Dr. Crow for 7/3/2019.   She had seen Dr. Charles but would like to have Dr. Crow as her doctor. Her  also sees Dr. Crow.  [facility] at [phone].    
rx sent   
97.7

## 2021-03-23 NOTE — OB RN TRIAGE NOTE - FALLEN IN THE PAST
Writer called into room by PT d/t patient's IV being pulled out during transfers and bleeding. Pressure applied and gauze secured with tape. Orienting RN to attempt new IV placement at this time. no

## 2021-03-23 NOTE — OB PROVIDER TRIAGE NOTE - HISTORY OF PRESENT ILLNESS
34 y/o  DEAN 3/23/21 @ 40 wks ga presenting c/o mildly painful contractions which began at 5am and are approx 5-10 minutes apart. She feels well overall. Denies vb or lof. +FM. GBS neg. EFW 8lb8oz last week by ultrasound. VE was closed last week.    all: nkda  meds: pnv            Levothyroxine 50 mcg daily    pmhx: Hypothyroid  ob: '14- FT   [id number] uncomplicated        '15 - FT  7ln 10 oz  uncomplicated though rapid delivery after SROM  gyn: denies  surg: denies  fhx: denies  soc: denies x 3.

## 2021-03-23 NOTE — OB RN DELIVERY SUMMARY - NS_SEPSISRSKCALC_OBGYN_ALL_OB_FT
EOS calculated successfully. EOS Risk Factor: 0.5/1000 live births ([facility] national incidence); GA=40w;Temp=98.42; ROM=3.917; GBS='Negative'; Antibiotics='No antibiotics or any antibiotics < 2 hrs prior to birth'

## 2021-03-23 NOTE — PRE-ANESTHESIA EVALUATION ADULT - NSANTHADDINFOFT_GEN_ALL_CORE
labor epidural explained to pt in detail; risks include but not limited to HA, failure, nv injury.  All questions answered.

## 2021-03-24 ENCOUNTER — TRANSCRIPTION ENCOUNTER (OUTPATIENT)
Age: 36
End: 2021-03-24

## 2021-03-24 VITALS
RESPIRATION RATE: 18 BRPM | DIASTOLIC BLOOD PRESSURE: 90 MMHG | HEART RATE: 93 BPM | OXYGEN SATURATION: 98 % | TEMPERATURE: 97 F | SYSTOLIC BLOOD PRESSURE: 126 MMHG

## 2021-03-24 LAB
COVID-19 SPIKE DOMAIN AB INTERP: NEGATIVE — SIGNIFICANT CHANGE UP
COVID-19 SPIKE DOMAIN ANTIBODY RESULT: 0.4 U/ML — SIGNIFICANT CHANGE UP
SARS-COV-2 IGG+IGM SERPL QL IA: 0.4 U/ML — SIGNIFICANT CHANGE UP
SARS-COV-2 IGG+IGM SERPL QL IA: NEGATIVE — SIGNIFICANT CHANGE UP
T PALLIDUM AB TITR SER: NEGATIVE — SIGNIFICANT CHANGE UP

## 2021-03-24 PROCEDURE — 86780 TREPONEMA PALLIDUM: CPT

## 2021-03-24 PROCEDURE — 86850 RBC ANTIBODY SCREEN: CPT

## 2021-03-24 PROCEDURE — 86769 SARS-COV-2 COVID-19 ANTIBODY: CPT

## 2021-03-24 PROCEDURE — 86900 BLOOD TYPING SEROLOGIC ABO: CPT

## 2021-03-24 PROCEDURE — 86901 BLOOD TYPING SEROLOGIC RH(D): CPT

## 2021-03-24 PROCEDURE — 59050 FETAL MONITOR W/REPORT: CPT

## 2021-03-24 PROCEDURE — G0463: CPT

## 2021-03-24 PROCEDURE — 87635 SARS-COV-2 COVID-19 AMP PRB: CPT

## 2021-03-24 PROCEDURE — 59025 FETAL NON-STRESS TEST: CPT

## 2021-03-24 PROCEDURE — 85025 COMPLETE CBC W/AUTO DIFF WBC: CPT

## 2021-03-24 RX ORDER — IBUPROFEN 200 MG
600 TABLET ORAL EVERY 6 HOURS
Refills: 0 | Status: DISCONTINUED | OUTPATIENT
Start: 2021-03-24 | End: 2021-03-24

## 2021-03-24 RX ORDER — ACETAMINOPHEN 500 MG
2 TABLET ORAL
Qty: 0 | Refills: 0 | DISCHARGE
Start: 2021-03-24

## 2021-03-24 RX ORDER — LEVOTHYROXINE SODIUM 125 MCG
1 TABLET ORAL
Qty: 0 | Refills: 0 | DISCHARGE

## 2021-03-24 RX ADMIN — Medication 975 MILLIGRAM(S): at 17:00

## 2021-03-24 RX ADMIN — Medication 600 MILLIGRAM(S): at 05:10

## 2021-03-24 RX ADMIN — Medication 975 MILLIGRAM(S): at 16:15

## 2021-03-24 RX ADMIN — Medication 975 MILLIGRAM(S): at 10:08

## 2021-03-24 RX ADMIN — Medication 600 MILLIGRAM(S): at 18:46

## 2021-03-24 RX ADMIN — Medication 50 MICROGRAM(S): at 05:10

## 2021-03-24 RX ADMIN — Medication 1 TABLET(S): at 13:29

## 2021-03-24 RX ADMIN — Medication 975 MILLIGRAM(S): at 01:10

## 2021-03-24 RX ADMIN — Medication 600 MILLIGRAM(S): at 13:30

## 2021-03-24 RX ADMIN — Medication 600 MILLIGRAM(S): at 12:39

## 2021-03-24 NOTE — DISCHARGE NOTE OB - CARE PLAN
Principal Discharge DX:	 (normal spontaneous vaginal delivery)  Goal:	pain control, nothing per vagina  Assessment and plan of treatment:	monitor bleeding  Secondary Diagnosis:	Hypothyroidism, unspecified type

## 2021-03-24 NOTE — PROGRESS NOTE ADULT - ASSESSMENT
36y/o  PPD#1 from  in stable condition.    - Continue with po analgesia  - Increase ambulation  - Continue regular diet  - IV lock  - No labs

## 2021-03-24 NOTE — DISCHARGE NOTE OB - PATIENT PORTAL LINK FT
You can access the FollowMyHealth Patient Portal offered by [facility] by registering at the following website: [email or web address]. By joining [employer]’s FollowMyHealth portal, you will also be able to view your health information using other applications (apps) compatible with our system.

## 2021-03-24 NOTE — DISCHARGE NOTE OB - CARE PROVIDER_API CALL
Kristan Barrow)  Obstetrics and Gynecology  [address], [address]  [place] [id number]  Phone: [phone]  Fax: [phone]  Follow Up Time:

## 2021-03-24 NOTE — DISCHARGE NOTE OB - MEDICATION SUMMARY - MEDICATIONS TO TAKE
I will START or STAY ON the medications listed below when I get home from the hospital:    ibuprofen 600 mg oral tablet  -- 1 tab(s) by mouth every 6 hours, As needed, Moderate Pain  -- Indication: For     acetaminophen 325 mg oral tablet  -- 2 tab(s) by mouth 4 to 6 times a day, As Needed  -- Indication: For     Prenatal 1 oral capsule  -- 1 cap(s) by mouth once a day  -- Indication: For

## 2021-03-24 NOTE — PROGRESS NOTE ADULT - SUBJECTIVE AND OBJECTIVE BOX
Patient is 36yo seen and examined at bedside, no acute overnight events.   No acute concerns, pain well controlled.   Patient is ambulating, voiding spontaneously, passing gas, and tolerating regular diet.  Lochia decreasing.  Denies CP, SOB, leg pain, N/V, HA, blurred vision, epigastric pain.    Vital Signs Last 24 Hours  T(C): 36.7 (03-24-21 @ 05:12), Max: 36.9 (03-23-21 @ 14:09)  HR: 75 (03-24-21 @ 05:12) (71 - 103)  BP: 112/74 (03-24-21 @ 05:12) (101/66 - 133/61)  RR: 18 (03-24-21 @ 05:12) (18 - 18)  SpO2: 97% (03-24-21 @ 05:12) (79% - 100%)    Physical Exam:  General: NAD  Abdomen: Soft, non-tender, non-distended, fundus firm  Pelvic: Lochia wnl  Ext: WWP, non-tender, symmetric, mild edema     Labs:  Blood type: B Negative  Rubella IgG: RPR: Negative                          12.2   6.69 >-----------< 202    ( 03-23 @ 12:24 )             36.1                      MEDICATIONS  (STANDING):  acetaminophen   Tablet .. 975 milliGRAM(s) Oral <User Schedule>  diphtheria/tetanus/pertussis (acellular) Vaccine (ADAcel) 0.5 milliLiter(s) IntraMuscular once  ibuprofen  Tablet. 600 milliGRAM(s) Oral every 6 hours  levothyroxine 50 MICROGram(s) Oral daily  oxytocin Infusion 333.333 milliUNIT(s)/Min (1000 mL/Hr) IV Continuous <Continuous>  oxytocin Infusion 333.333 milliUNIT(s)/Min (1000 mL/Hr) IV Continuous <Continuous>  oxytocin Infusion. 4 milliUNIT(s)/Min (4 mL/Hr) IV Continuous <Continuous>  prenatal multivitamin 1 Tablet(s) Oral daily  sodium chloride 0.9% lock flush 3 milliLiter(s) IV Push every 8 hours    MEDICATIONS  (PRN):  benzocaine 20%/menthol 0.5% Spray 1 Spray(s) Topical every 6 hours PRN for Perineal discomfort  dibucaine 1% Ointment 1 Application(s) Topical every 6 hours PRN Perineal discomfort  diphenhydrAMINE 25 milliGRAM(s) Oral every 6 hours PRN Pruritus  hydrocortisone 1% Cream 1 Application(s) Topical every 6 hours PRN Moderate Pain (4-6)  lanolin Ointment 1 Application(s) Topical every 6 hours PRN nipple soreness  magnesium hydroxide Suspension 30 milliLiter(s) Oral two times a day PRN Constipation  oxyCODONE    IR 5 milliGRAM(s) Oral every 3 hours PRN Moderate to Severe Pain (4-10)  oxyCODONE    IR 5 milliGRAM(s) Oral once PRN Moderate to Severe Pain (4-10)  pramoxine 1%/zinc 5% Cream 1 Application(s) Topical every 4 hours PRN Moderate Pain (4-6)  simethicone 80 milliGRAM(s) Chew every 4 hours PRN Gas  witch hazel Pads 1 Application(s) Topical every 4 hours PRN Perineal discomfort       Patient is 34yo seen and examined at bedside, no acute overnight events.   No acute concerns, pain well controlled.   Patient is ambulating, voiding spontaneously, not yet passing gas, and tolerating regular diet.  Lochia decreasing.  Denies CP, SOB, leg pain, N/V, HA, blurred vision, epigastric pain.    Vital Signs Last 24 Hours  T(C): 36.7 (03-24-21 @ 05:12), Max: 36.9 (03-23-21 @ 14:09)  HR: 75 (03-24-21 @ 05:12) (71 - 103)  BP: 112/74 (03-24-21 @ 05:12) (101/66 - 133/61)  RR: 18 (03-24-21 @ 05:12) (18 - 18)  SpO2: 97% (03-24-21 @ 05:12) (79% - 100%)    Physical Exam:  General: NAD  Abdomen: Soft, non-tender, non-distended, fundus firm  Pelvic: Lochia wnl  Ext: WWP, non-tender, symmetric, mild edema     Labs:  Blood type: B Negative  Rubella IgG: RPR: Negative                          12.2   6.69 >-----------< 202    ( 03-23 @ 12:24 )             36.1                      MEDICATIONS  (STANDING):  acetaminophen   Tablet .. 975 milliGRAM(s) Oral <User Schedule>  diphtheria/tetanus/pertussis (acellular) Vaccine (ADAcel) 0.5 milliLiter(s) IntraMuscular once  ibuprofen  Tablet. 600 milliGRAM(s) Oral every 6 hours  levothyroxine 50 MICROGram(s) Oral daily  oxytocin Infusion 333.333 milliUNIT(s)/Min (1000 mL/Hr) IV Continuous <Continuous>  oxytocin Infusion 333.333 milliUNIT(s)/Min (1000 mL/Hr) IV Continuous <Continuous>  oxytocin Infusion. 4 milliUNIT(s)/Min (4 mL/Hr) IV Continuous <Continuous>  prenatal multivitamin 1 Tablet(s) Oral daily  sodium chloride 0.9% lock flush 3 milliLiter(s) IV Push every 8 hours    MEDICATIONS  (PRN):  benzocaine 20%/menthol 0.5% Spray 1 Spray(s) Topical every 6 hours PRN for Perineal discomfort  dibucaine 1% Ointment 1 Application(s) Topical every 6 hours PRN Perineal discomfort  diphenhydrAMINE 25 milliGRAM(s) Oral every 6 hours PRN Pruritus  hydrocortisone 1% Cream 1 Application(s) Topical every 6 hours PRN Moderate Pain (4-6)  lanolin Ointment 1 Application(s) Topical every 6 hours PRN nipple soreness  magnesium hydroxide Suspension 30 milliLiter(s) Oral two times a day PRN Constipation  oxyCODONE    IR 5 milliGRAM(s) Oral every 3 hours PRN Moderate to Severe Pain (4-10)  oxyCODONE    IR 5 milliGRAM(s) Oral once PRN Moderate to Severe Pain (4-10)  pramoxine 1%/zinc 5% Cream 1 Application(s) Topical every 4 hours PRN Moderate Pain (4-6)  simethicone 80 milliGRAM(s) Chew every 4 hours PRN Gas  witch hazel Pads 1 Application(s) Topical every 4 hours PRN Perineal discomfort

## 2021-03-24 NOTE — DISCHARGE NOTE OB - MEDICATION SUMMARY - MEDICATIONS TO STOP TAKING
I will STOP taking the medications listed below when I get home from the hospital:    Pt seen and evaluated on labor and delivery 3/23. Cleared to return to work 3/24.

## 2021-03-24 NOTE — PROGRESS NOTE ADULT - SUBJECTIVE AND OBJECTIVE BOX
OB Attending Note    S: Patient doing well. Minimal lochia. Pain controlled.    O: Vital Signs Last 24 Hrs  T(C): 36.6 (24 Mar 2021 08:45), Max: 36.9 (23 Mar 2021 14:09)  T(F): 97.9 (24 Mar 2021 08:45), Max: 98.42 (23 Mar 2021 14:09)  HR: 79 (24 Mar 2021 08:45) (71 - 100)  BP: 114/77 (24 Mar 2021 08:45) (101/66 - 133/61)    Gen: NAD  Abd: soft, NT, ND, fundus firm below umbilicus  Lochia: min  Perineum healing well  Ext: no tenderness    Labs:                        12.2   6.69  )-----------( 202      ( 23 Mar 2021 12:24 )             36.1       A: 35y PPD#1  s/p  doing well.    Plan:   Circ counseling done, risks and benefits reviewed, Risks include not limited to bleeding, infection, organ damage, permanent cosmetic changes and need for repeat circ  Discharge instructions reviewed, pain control with NSAIDS/continue PNVs, nothing per vagina x6 weeks  f/u 6 weeks for pp check, call with issues or concerns

## 2021-05-09 ENCOUNTER — RESULT REVIEW (OUTPATIENT)
Age: 36
End: 2021-05-09

## 2022-06-02 NOTE — OB PROVIDER H&P - HISTORY OF PRESENT ILLNESS
EOMI, anicteric sclera 34 y/o  DEAN 3/23/21 @ 40 wks ga presenting c/o mildly painful contractions which began at 5am and are approx 5-10 minutes apart. She feels well overall. Denies vb or lof. +FM. GBS neg. EFW 8lb8oz last week by ultrasound. VE was closed last week.    all: nkda  meds: pnv            Levothyroxine 50 mcg daily    pmhx: Hypothyroid  ob: '14- FT   [id number] uncomplicated        '15 - FT  7ln 10 oz  uncomplicated though rapid delivery after SROM  gyn: denies  surg: denies  fhx: denies  soc: denies x 3.

## 2024-05-03 ENCOUNTER — TRANSCRIPTION ENCOUNTER (OUTPATIENT)
Age: 39
End: 2024-05-03

## 2024-12-11 NOTE — OB RN TRIAGE NOTE - FAMILY HISTORY
Spoke with Dad, reviewed recommendations. Dad verbalized understanding. Advised to call office with any questions or concerns.   
No pertinent family history in first degree relatives

## 2025-02-11 NOTE — OB RN PATIENT PROFILE - RUBELLA: DATE, OB PROFILE
Initiate Treatment: Patient will spot treat rough spots on face with Fluorouracil she has at home twice a day for two weeks. Patient can use hydrocortisone cream to chest twice a day for two weeks to help with itch from Fluorouracil reaction. Render In Strict Bullet Format?: No Detail Level: Zone 19-Aug-2020

## 2025-05-05 NOTE — OB RN DELIVERY SUMMARY - NS_DELIVERYROOM_OBGYN_ALL_OB_FT
Patient reports burning, frequency, and urgency urination started at 4am today. She felt hot with some chills today unable to take temperature because she is currently at work. Denies cloudy / blood in urine, back / abdominal pain, nausea, vomiting. She is drinking plenty of fluids.     She states she was prescribed Nitrofurantoin last 4/24 for UTI but unable to complete the full dose. She states due to her recent gastric bypass, she has difficulty taking too many pills. So she has four pills left of the antibiotic. Today, she took Nitrofurantoin one dose plus Azo.     Patient completed E-Visit via [facility].     To Dr Astudillo - Please advise. UA C& S pended.   6

## 2025-06-11 NOTE — OB RN TRIAGE NOTE - NO PERTINENT FAMILY HISTORY
Anticoagulation Clinic Progress Note    Anticoagulation Summary  As of 2025      INR goal:  2.5-3.5   TTR:  76.0% (6.5 y)   INR used for dosin.60 (2025)   Warfarin maintenance plan:  10 mg every Mon, Wed, Fri; 8 mg all other days   Weekly warfarin total:  62 mg   No change documented:  Jeevan Davies, Pharmacy Technician   Plan last modified:  Heather Kline RPH (2025)   Next INR check:  2025   Priority:  High   Target end date:  Indefinite    Indications    History of mechanical aortic valve replacement [Z95.2]  Long term (current) use of anticoagulants [Z79.01]                 Anticoagulation Episode Summary       INR check location:  Home Draw    Preferred lab:  --    Send INR reminders to:   [facility]  [facility]    Comments:  **[facility]**          Anticoagulation Care Providers       Provider Role Specialty Phone number    Lorne Kam MD Referring Cardiology [phone]            Clinic Interview:  No pertinent clinical findings have been reported.    INR History:      2025     9:57 AM 2025    12:00 AM 2025     2:21 PM 2025    12:00 AM 2025     9:26 AM 2025    12:00 AM 2025     3:33 PM   Anticoagulation Monitoring   INR 2.20  2.20  2.90  2.60   INR Date 2025   INR Goal 2.5-3.5  2.5-3.5  2.5-3.5  2.5-3.5   Trend Up  Up  Same  Same   Last Week Total 60 mg  60 mg  62 mg  62 mg   Next Week Total 60 mg  62 mg  62 mg  62 mg   Sun 8 mg  8 mg  8 mg  8 mg   Mon 10 mg  10 mg  10 mg  10 mg   Tue 8 mg  8 mg  8 mg  8 mg   Wed 8 mg  10 mg  10 mg  10 mg   Thu 10 mg ()  8 mg  8 mg  8 mg   Fri 8 mg  10 mg  10 mg  10 mg   Sat 8 mg  8 mg  8 mg  8 mg   Historical INR  2.20      2.90  C     2.60           C Corrected result    This result is from an external source.       Plan:  1. INR is therapeutic today- see above in Anticoagulation Summary.    Man Ramsey to continue their warfarin regimen- see  above in Anticoagulation Summary.  2. Follow up in 1 week  3. Pt has agreed to only be called if INR out of range. They have been instructed to call if any changes in medications, doses, concerns, etc. Patient expresses understanding and has no further questions at this time.    Jeevan Davies, Pharmacy Technician     <<----- Click to add NO pertinent Family History